# Patient Record
Sex: FEMALE | URBAN - METROPOLITAN AREA
[De-identification: names, ages, dates, MRNs, and addresses within clinical notes are randomized per-mention and may not be internally consistent; named-entity substitution may affect disease eponyms.]

---

## 2023-06-01 ENCOUNTER — OFFICE VISIT (OUTPATIENT)
Facility: LOCATION | Age: 68
End: 2023-06-01
Payer: MEDICARE

## 2023-06-01 DIAGNOSIS — H40.1133 PRIMARY OPEN-ANGLE GLAUCOMA BILATERAL SEVERE STAGE: Primary | ICD-10-CM

## 2023-06-01 DIAGNOSIS — H04.123 DRY EYE SYNDROME OF BILATERAL LACRIMAL GLANDS: ICD-10-CM

## 2023-06-01 DIAGNOSIS — E11.9 TYPE 2 DIABETES MELLITUS WITHOUT COMPLICATIONS: ICD-10-CM

## 2023-06-01 DIAGNOSIS — H46.8 OTHER OPTIC NEURITIS: ICD-10-CM

## 2023-06-01 PROCEDURE — 92133 CPTRZD OPH DX IMG PST SGM ON: CPT | Performed by: OPHTHALMOLOGY

## 2023-06-01 PROCEDURE — 99214 OFFICE O/P EST MOD 30 MIN: CPT | Performed by: OPHTHALMOLOGY

## 2023-06-01 PROCEDURE — 92020 GONIOSCOPY: CPT | Performed by: OPHTHALMOLOGY

## 2023-06-01 RX ORDER — LATANOPROSTENE BUNOD 0.24 MG/ML
0.024 % SOLUTION/ DROPS OPHTHALMIC
Qty: 2.5 | Refills: 11 | Status: ACTIVE
Start: 2023-06-01

## 2023-06-01 ASSESSMENT — INTRAOCULAR PRESSURE
OD: 12
OS: 11

## 2023-06-01 NOTE — IMPRESSION/PLAN
Impression: Per MRI orbit from 4/2021 active retrobulbal neuritis No optic nerve edema OU, severe cupping OU. Quantiferon Gold positive. Per patient last seen by Dr. Cornelius Hairston end 2021. Patient currently treated for active tuberculosis. Plan: Monitor. Patient reports no longer been seen by Dr. Cornelius Hairston. 
Patient being followed by infectious disease

## 2023-06-01 NOTE — IMPRESSION/PLAN
Impression: Patient has type II diabetes with no complications. Non - insulin dependent Dx 2019 Controlled on metformin Plan: Encouraged good glucose, lipid, and BP control. Keep appointments with PCP for ongoing management and F/U care.

## 2023-06-01 NOTE — IMPRESSION/PLAN
Impression: 10 months with OCT ONH OU and gonio. Patient is late by 6 months. S/p SLT OU 4/2022. Preop IOPs 14/14. POHx: POAG severe OU, Optic atrophy OU, s/p SLT OU 14/14 (w/ Timolol) -> 13/13 (w/o Timolol). FOHx: (-) for ocular disease. PMHx: DMII (since 2019). Eye meds: Vyzulta QHS OU. Stopped Timolol 4/2022. Good reduction with Timolol 18/18 ->14/14. Allergies: Timolol burning. TMAX : 20/20. Target IOP: < 14 OU. Plan: Testing:
No APD OU. Colors plates: Full OU. MRI brain w and w/o contrast 4/2021: Unremarkable. MRI orbits w and w/o contrast: Comparable with acute/active focal opric neuritis involving distal retrobulbar segments of b/l optic nerves. [place] [facility] [place] thin S/I. OD improved and stable, OS stable HVF 30-2 2/2021: OU reliable. OD dense SAS/SNS/IAS. OS sup altitudinal/dense SNS. Baseline HVF 24-2 10/2022: OU reliable. OD sup altitudinal/INS, OS sup altitudinal/IAS. No definite progression. Pachys: 543/544. Gonio 1/2022:  PTM, 2+ pigment 360 OU, No PAS OU. Gonio 06/2023: OU S/I/N CORNEL, T Closed. Today:
IOP acceptable OU. OCT ONH performed and reviewed. Informed patient glaucoma is stable at this time. Plan:
Continue Vyzulta QHS OU. RTC in 4 months with HVF 24-2 SF OU.

## 2023-06-01 NOTE — IMPRESSION/PLAN
Impression: Examination revealed dry eye syndrome secondary to tear deficiencies. Patient complaining of discomfort and tearing with the wind and bright sun. Plan: Today:
Patient c/o worsened dryness causing more discomfort despite compliace with ATs. Informed patient of dryness shown on examination is cause for her discomfort. Discussed R/B/A/is of pucntal plugs as next step to improve dryness. However, due to patient's high deductible, patient deferred to proceed with procedure. Recommended alternative treatments which may aid in comfort. Plan:
Recommend use of ATs QID OU. Start Hot Compresses BID OU.

## 2024-10-24 ENCOUNTER — OFFICE VISIT (OUTPATIENT)
Facility: LOCATION | Age: 69
End: 2024-10-24
Payer: COMMERCIAL

## 2024-10-24 DIAGNOSIS — H46.8 OTHER OPTIC NEURITIS: ICD-10-CM

## 2024-10-24 DIAGNOSIS — H04.123 DRY EYE SYNDROME OF BILATERAL LACRIMAL GLANDS: ICD-10-CM

## 2024-10-24 DIAGNOSIS — H40.1133 PRIMARY OPEN-ANGLE GLAUCOMA BILATERAL SEVERE STAGE: Primary | ICD-10-CM

## 2024-10-24 PROCEDURE — 92020 GONIOSCOPY: CPT | Performed by: OPHTHALMOLOGY

## 2024-10-24 PROCEDURE — 92083 EXTENDED VISUAL FIELD XM: CPT | Performed by: OPHTHALMOLOGY

## 2024-10-24 PROCEDURE — 99213 OFFICE O/P EST LOW 20 MIN: CPT | Performed by: OPHTHALMOLOGY

## 2024-10-24 ASSESSMENT — INTRAOCULAR PRESSURE
OS: 11
OD: 10

## 2024-12-20 ENCOUNTER — OFFICE VISIT (OUTPATIENT)
Facility: LOCATION | Age: 69
End: 2024-12-20
Payer: COMMERCIAL

## 2024-12-20 DIAGNOSIS — H16.223 KERATOCONJUNCT SICCA, NOT SPECIFIED AS SJOGREN'S, BILATERAL: ICD-10-CM

## 2024-12-20 DIAGNOSIS — H40.1133 PRIMARY OPEN-ANGLE GLAUCOMA BILATERAL SEVERE STAGE: ICD-10-CM

## 2024-12-20 DIAGNOSIS — H04.123 DRY EYE SYNDROME OF BILATERAL LACRIMAL GLANDS: ICD-10-CM

## 2024-12-20 DIAGNOSIS — H25.13 AGE-RELATED NUCLEAR CATARACT, BILATERAL: Primary | ICD-10-CM

## 2024-12-20 PROCEDURE — 92134 CPTRZ OPH DX IMG PST SGM RTA: CPT | Performed by: OPHTHALMOLOGY

## 2024-12-20 PROCEDURE — 99214 OFFICE O/P EST MOD 30 MIN: CPT | Performed by: OPHTHALMOLOGY

## 2024-12-20 PROCEDURE — 92136 OPHTHALMIC BIOMETRY: CPT | Performed by: OPHTHALMOLOGY

## 2024-12-20 PROCEDURE — 92025 CPTRIZED CORNEAL TOPOGRAPHY: CPT | Performed by: OPHTHALMOLOGY

## 2024-12-20 RX ORDER — KETOROLAC TROMETHAMINE 5 MG/ML
0.5 % SOLUTION OPHTHALMIC
Qty: 10 | Refills: 1 | Status: ACTIVE
Start: 2024-12-20

## 2024-12-20 RX ORDER — PREDNISOLONE ACETATE 10 MG/ML
1 % SUSPENSION/ DROPS OPHTHALMIC
Qty: 10 | Refills: 1 | Status: ACTIVE
Start: 2024-12-20

## 2024-12-20 RX ORDER — OFLOXACIN 3 MG/ML
0.3 % SOLUTION/ DROPS OPHTHALMIC
Qty: 3 | Refills: 1 | Status: ACTIVE
Start: 2024-12-20

## 2025-01-27 ENCOUNTER — Encounter (OUTPATIENT)
Facility: LOCATION | Age: 70
End: 2025-01-27
Payer: MEDICARE

## 2025-01-27 ENCOUNTER — PROCEDURE (OUTPATIENT)
Facility: LOCATION | Age: 70
End: 2025-01-27
Payer: MEDICARE

## 2025-01-27 PROCEDURE — CRBAL CREDIT BALANCE: CUSTOM | Performed by: OPHTHALMOLOGY

## 2025-01-28 ENCOUNTER — POST-OPERATIVE VISIT (OUTPATIENT)
Facility: LOCATION | Age: 70
End: 2025-01-28
Payer: MEDICARE

## 2025-01-28 DIAGNOSIS — Z48.810 ENCOUNTER FOR SURGICAL AFTERCARE FOLLOWING SURGERY ON A SENSE ORGAN: Primary | ICD-10-CM

## 2025-01-28 PROCEDURE — 99024 POSTOP FOLLOW-UP VISIT: CPT | Performed by: OPTOMETRIST

## 2025-01-28 ASSESSMENT — INTRAOCULAR PRESSURE
OS: 9
OS: 28
OD: 14

## 2025-02-04 ENCOUNTER — POST-OPERATIVE VISIT (OUTPATIENT)
Facility: LOCATION | Age: 70
End: 2025-02-04
Payer: MEDICARE

## 2025-02-04 DIAGNOSIS — Z48.810 ENCOUNTER FOR SURGICAL AFTERCARE FOLLOWING SURGERY ON A SENSE ORGAN: Primary | ICD-10-CM

## 2025-02-04 DIAGNOSIS — H40.1133 PRIMARY OPEN-ANGLE GLAUCOMA BILATERAL SEVERE STAGE: ICD-10-CM

## 2025-02-04 DIAGNOSIS — H25.11 AGE-RELATED NUCLEAR CATARACT, RIGHT EYE: ICD-10-CM

## 2025-02-04 PROCEDURE — 99024 POSTOP FOLLOW-UP VISIT: CPT | Performed by: OPTOMETRIST

## 2025-02-04 ASSESSMENT — INTRAOCULAR PRESSURE
OD: 14
OS: 12

## 2025-02-04 ASSESSMENT — VISUAL ACUITY: OS: 20/20

## 2025-02-10 ENCOUNTER — PROCEDURE (OUTPATIENT)
Facility: LOCATION | Age: 70
End: 2025-02-10
Payer: COMMERCIAL

## 2025-02-10 ENCOUNTER — Encounter (OUTPATIENT)
Facility: LOCATION | Age: 70
End: 2025-02-10
Payer: COMMERCIAL

## 2025-02-11 ENCOUNTER — POST-OPERATIVE VISIT (OUTPATIENT)
Facility: LOCATION | Age: 70
End: 2025-02-11
Payer: COMMERCIAL

## 2025-02-11 DIAGNOSIS — Z96.1 PRESENCE OF INTRAOCULAR LENS: Primary | ICD-10-CM

## 2025-02-11 PROCEDURE — 99024 POSTOP FOLLOW-UP VISIT: CPT | Performed by: OPTOMETRIST

## 2025-02-11 ASSESSMENT — INTRAOCULAR PRESSURE
OD: 11
OS: 13

## 2025-02-18 ENCOUNTER — POST-OPERATIVE VISIT (OUTPATIENT)
Facility: LOCATION | Age: 70
End: 2025-02-18

## 2025-02-18 DIAGNOSIS — H40.1133 PRIMARY OPEN-ANGLE GLAUCOMA BILATERAL SEVERE STAGE: ICD-10-CM

## 2025-02-18 DIAGNOSIS — Z96.1 PRESENCE OF INTRAOCULAR LENS: Primary | ICD-10-CM

## 2025-02-18 PROCEDURE — 99024 POSTOP FOLLOW-UP VISIT: CPT | Performed by: OPTOMETRIST

## 2025-02-18 ASSESSMENT — VISUAL ACUITY
OD: 20/20
OS: 20/20

## 2025-02-18 ASSESSMENT — INTRAOCULAR PRESSURE
OS: 16
OD: 16

## 2025-02-18 ASSESSMENT — KERATOMETRY
OD: 43.38
OS: 43.75

## 2025-03-27 ENCOUNTER — POST-OPERATIVE VISIT (OUTPATIENT)
Facility: LOCATION | Age: 70
End: 2025-03-27
Payer: MEDICARE

## 2025-03-27 DIAGNOSIS — Z96.1 PRESENCE OF INTRAOCULAR LENS: Primary | ICD-10-CM

## 2025-03-27 PROCEDURE — 99024 POSTOP FOLLOW-UP VISIT: CPT | Performed by: OPHTHALMOLOGY

## 2025-03-27 RX ORDER — PREDNISOLONE ACETATE 10 MG/ML
1 % SUSPENSION/ DROPS OPHTHALMIC
Qty: 10 | Refills: 1 | Status: ACTIVE
Start: 2025-03-27

## 2025-03-27 ASSESSMENT — INTRAOCULAR PRESSURE
OD: 13
OS: 13

## 2025-03-27 ASSESSMENT — VISUAL ACUITY
OD: 20/20
OS: 20/20

## 2025-05-15 ENCOUNTER — OFFICE VISIT (OUTPATIENT)
Facility: LOCATION | Age: 70
End: 2025-05-15
Payer: COMMERCIAL

## 2025-05-15 DIAGNOSIS — H40.1133 PRIMARY OPEN-ANGLE GLAUCOMA BILATERAL SEVERE STAGE: Primary | ICD-10-CM

## 2025-05-15 DIAGNOSIS — H04.123 DRY EYE SYNDROME OF BILATERAL LACRIMAL GLANDS: ICD-10-CM

## 2025-05-15 PROCEDURE — 99213 OFFICE O/P EST LOW 20 MIN: CPT | Performed by: OPHTHALMOLOGY

## 2025-05-15 RX ORDER — PREDNISOLONE ACETATE 10 MG/ML
1 % SUSPENSION/ DROPS OPHTHALMIC
Qty: 10 | Refills: 1 | Status: ACTIVE
Start: 2025-05-15

## 2025-05-15 RX ORDER — LATANOPROSTENE BUNOD 0.24 MG/ML
0.024 % SOLUTION/ DROPS OPHTHALMIC
Qty: 2.5 | Refills: 11 | Status: ACTIVE
Start: 2025-05-15

## 2025-05-15 ASSESSMENT — INTRAOCULAR PRESSURE
OS: 13
OD: 11
OS: 12
OD: 12